# Patient Record
Sex: FEMALE | Race: WHITE | NOT HISPANIC OR LATINO | URBAN - METROPOLITAN AREA
[De-identification: names, ages, dates, MRNs, and addresses within clinical notes are randomized per-mention and may not be internally consistent; named-entity substitution may affect disease eponyms.]

---

## 2023-04-08 DIAGNOSIS — N63.25 BREAST LUMP ON LEFT SIDE AT 12 O'CLOCK POSITION: Primary | ICD-10-CM

## 2023-04-08 PROBLEM — Z80.3 FAMILY HISTORY OF BREAST CANCER: Status: ACTIVE | Noted: 2023-04-08

## 2023-04-08 PROBLEM — N60.12 FIBROCYSTIC DISEASE OF LEFT BREAST: Status: ACTIVE | Noted: 2023-04-08

## 2023-04-08 NOTE — PROGRESS NOTES
[facility]  Chu Walters MD, FACS  Osmar Morgan NP-C      Chief Complaint:   Joan Nava is a 57 y.o. female presenting today for her 6 month evaluation. She is due for her mammogram.  She reports no interval changes.     History of Present Illness:   Mrs. Elijah first presented on August 3, 2020 due to a left breast mass that increased in size and sonocore biopsy revealed Fibrocystic changes including stromal fibrosis, cyst formation and apocrine metaplasia. Negative for atypia and malignancy. She also presented with benign cystic changes that have remained stable. Her OSIRIS was calculated in 2021 and found to give her a 26.3 % Lifetime Risk of Breast Cancer. MD::: Edna Sullivan MD.    Past Medical History:   Diagnosis Date    Breast lump on left side at 12 o'clock position 4/8/2023    Dense breast tissue on mammogram 4/19/2023    Family history of breast cancer 4/8/2023    Fibrocystic disease of left breast 4/8/2023      History reviewed. No pertinent surgical history.     Current Outpatient Medications:     calcium 26-vit D3-magnesium 15 167 mg calcium- 1.67 mcg-83 mg Cap, Take by mouth once daily., Disp: , Rfl:     ergocalciferol, vitamin D2, 10 mcg (400 unit) Tab, Take 1,000 Int'l Units by mouth., Disp: , Rfl:    Review of patient's allergies indicates:   Allergen Reactions    Sulfa (sulfonamide antibiotics) Rash      Social History     Tobacco Use    Smoking status: Not on file    Smokeless tobacco: Not on file   Substance Use Topics    Alcohol use: Not on file      History reviewed. No pertinent family history.     Review of Systems   Integumentary:  Negative for color change, rash, mole/lesion, breast mass, breast discharge and breast tenderness.   Breast: Negative for mass and tenderness     Physical Exam   HENT:   Head: Normocephalic.   Pulmonary/Chest: Right breast exhibits no inverted nipple, no mass, no nipple discharge, no skin change and no tenderness.  Left breast exhibits no inverted nipple, no mass, no nipple discharge, no skin change and no tenderness. No breast swelling.   Genitourinary: No breast swelling.   Musculoskeletal: Lymphadenopathy:      Upper Body:      Right upper body: No supraclavicular or axillary adenopathy.      Left upper body: No supraclavicular or axillary adenopathy.     Neurological: She is alert.      MAMMOGRAM REPORT: she has bilateral nodules    ULTRASOUND REPORT: She has bilateral complicated cysts; NEM    NOTE:::We viewed her films together at today's visit.  We discussed the multiple views obtained and the important findings.  Even benign changes were mentioned and her questions were answered.  She knows that she may receive a formal letter or report from the Radiologist.  She is to contact us if she has questions.        ASSESSMENT and PLAN of CARE    1. Breast lump on left side at 12 o'clock position  Assessment & Plan:  She understands that her exams have remained stable and is comfortable being followed in a conservative fashion. She understands the importance of monthly self-breast examination and knows to report any and all changes as they occur.    She has new right breast nodularity that is consistent with FCCs at ultrasound - see below.        2. Family history of breast cancer  Assessment & Plan:  We discussed her family history and how it could impact her own future risks.  We discussed family vs. genetic history and the importance and implications of each.  Genetic Counseling/Testing was offered, and all questions answered to her satisfaction.  She knows that as additional family members are diagnosed - she will need to let us know as this may change follow up and imaging recommendations.        3. Fibrocystic disease of left breast  Assessment & Plan:  She has new findings of complicated cysts bilaterally.  These will be followed in the short term.    We discussed our fibrocystic mastopathy protocol in detail. She knows  that if she follows this protocol - that her symptoms should improve.  We discussed how breast pain is usually not associated with breast cancer, however, pain can be the presenting symptom with some cancers (but this could be coincidental). Still, if her pain does not improve in 8-12 weeks she should call us back for additional recommendations.        4. Dense breast tissue on mammogram  Assessment & Plan:  We discussed the amount of dense tissue that is seen on her mammogram.  There is data to suggest that dense breast tissue increases breast cancer risk - exactly how much is difficult to accurately calculate.  She realizes that this dense tissue can make it difficult or impossible to view all tissue in the breast to rule out all issues.  It can also make it more difficult to see cancerous tissue.  Due to this, she should be diligent in her monthly exam and report changes as they occur.  Supplemental imaging may also be recommended such as a screening bilateral ultrasound or MRI of the Breast.  She knows that our goal is to assist her in finding breast cancer at an earlier stage (should she develop one).  While nothing is guaranteed - complimenting her annual mammogram with supplemental imaging could help.        Medical Decision Making:  It is my impression that this patient suffers all conditions contained in this medical document.  Each of these conditions did affect our plan of care and my medical decision making today.  It is my opinion that the medical decision making concerning this patient was of minimal  difficulty based on the aforementioned conditions.  Any further recommendations will be communicated to the patient by me.  I have reviewed and verified her allergies, list of medications, medical and surgical histories, social history, and a pertinent review of symptoms.     Follow up:  6 months and prn    For:  US JANN PEDRAZA) yanely stanford

## 2023-04-19 ENCOUNTER — OFFICE VISIT (OUTPATIENT)
Dept: SURGERY | Facility: CLINIC | Age: 58
End: 2023-04-19
Payer: COMMERCIAL

## 2023-04-19 VITALS — BODY MASS INDEX: 27.49 KG/M2 | WEIGHT: 165 LBS | HEIGHT: 65 IN

## 2023-04-19 DIAGNOSIS — R92.30 DENSE BREAST TISSUE ON MAMMOGRAM: ICD-10-CM

## 2023-04-19 DIAGNOSIS — N63.25 BREAST LUMP ON LEFT SIDE AT 12 O'CLOCK POSITION: Primary | ICD-10-CM

## 2023-04-19 DIAGNOSIS — N60.12 FIBROCYSTIC DISEASE OF LEFT BREAST: ICD-10-CM

## 2023-04-19 DIAGNOSIS — Z80.3 FAMILY HISTORY OF BREAST CANCER: ICD-10-CM

## 2023-04-19 PROCEDURE — 99213 PR OFFICE/OUTPT VISIT, EST, LEVL III, 20-29 MIN: ICD-10-PCS | Mod: S$GLB,,, | Performed by: SPECIALIST

## 2023-04-19 PROCEDURE — 99213 OFFICE O/P EST LOW 20 MIN: CPT | Mod: S$GLB,,, | Performed by: SPECIALIST

## 2023-04-19 RX ORDER — CALCIUM 26/MAGNESIUM 15/ZINC 167MG-83MG
CAPSULE ORAL DAILY
COMMUNITY

## 2023-04-19 RX ORDER — ERGOCALCIFEROL (VITAMIN D2) 10 MCG
1000 TABLET ORAL
COMMUNITY

## 2023-10-02 DIAGNOSIS — N63.13 MASS OF LOWER OUTER QUADRANT OF RIGHT BREAST: Primary | ICD-10-CM

## 2023-10-02 DIAGNOSIS — N63.21 MASS OF UPPER OUTER QUADRANT OF LEFT BREAST: ICD-10-CM

## 2023-10-02 PROBLEM — N60.12 DIFFUSE CYSTIC MASTOPATHY OF BOTH BREASTS: Status: ACTIVE | Noted: 2023-04-08

## 2023-10-02 PROBLEM — N60.11 DIFFUSE CYSTIC MASTOPATHY OF BOTH BREASTS: Status: ACTIVE | Noted: 2023-04-08

## 2023-10-03 NOTE — PROGRESS NOTES
[facility]  MD Osmar Horne, NP-C    Date of Service: 10/10/2023      Chief Complaint:   Joan Nava is a 58 y.o. female presenting today for  6 month follow up. She is due for Ultrasound  She reports no interval changes on her self-breast examination.     History of Present Illness:   Mrs. Elijah first presented on August 3, 2020 due to a left breast mass that increased in size and sonocore biopsy revealed Fibrocystic changes including stromal fibrosis, cyst formation and apocrine metaplasia. Negative for atypia and malignancy. She also presented with benign cystic changes that have remained stable. Her OSIRIS was calculated in 2021 and found to give her a 26.3 % Lifetime Risk of Breast Cancer. MD::: Edna Sullivan MD.    Past Medical History:   Diagnosis Date    Breast lump on left side at 12 o'clock position 4/8/2023    Dense breast tissue on mammogram 4/19/2023    Family history of breast cancer 4/8/2023    Fibrocystic disease of left breast 4/8/2023    Mass of lower outer quadrant of right breast 10/2/2023    Mass of upper outer quadrant of left breast 10/2/2023      Past Surgical History:   Procedure Laterality Date    Breast Sonocore Biopsy Left 08/03/2021    c section  1995        Current Outpatient Medications:     calcium 26-vit D3-magnesium 15 167 mg calcium- 1.67 mcg-83 mg Cap, Take by mouth once daily., Disp: , Rfl:     ergocalciferol, vitamin D2, 10 mcg (400 unit) Tab, Take 1,000 Int'l Units by mouth., Disp: , Rfl:    Review of patient's allergies indicates:   Allergen Reactions    Sulfa (sulfonamide antibiotics) Rash      Social History     Tobacco Use    Smoking status: Never    Smokeless tobacco: Never   Substance Use Topics    Alcohol use: Not on file      Family History   Problem Relation Age of Onset    Breast cancer Mother 71    Breast cancer Sister 43        Gene negative    Ovarian cancer Neg Hx         Review of Systems    Integumentary:  Negative for color change, rash, mole/lesion, breast mass, breast discharge and breast tenderness.   Breast: Negative for mass and tenderness       Physical Exam   HENT:   Head: Normocephalic.   Pulmonary/Chest: Right breast exhibits no inverted nipple, no mass, no nipple discharge, no skin change and no tenderness. Left breast exhibits no inverted nipple, no mass, no nipple discharge, no skin change and no tenderness. No breast swelling.   Genitourinary: No breast swelling.   Musculoskeletal: Lymphadenopathy:      Upper Body:      Right upper body: No supraclavicular or axillary adenopathy.      Left upper body: No supraclavicular or axillary adenopathy.     Neurological: She is alert.      Ultrasound: Complicated cysts of the right and left breast, unchanged from comparison studies and are probably benign. No new sonographic abnormalities identified. Recommend follow up with annual mammogram in April 2024.      Assessment/Plan  1. Mass of lower outer quadrant of right breast  Assessment & Plan:  We reviewed our findings today and her questions were answered.  She understands that her imaging and exams have remained stable (and show nothing concerning).  She is comfortable being followed in a conservative fashion.      She understands the importance of monthly self-breast examination and knows to report any and all changes as they occur.        2. Mass of upper outer quadrant of left breast  Assessment & Plan:  Same as above      3. Diffuse cystic mastopathy of both breasts  Assessment & Plan:  We discussed our Fibrocystic Mastopathy Protocol in detail. She should take Vitamin E 800 IU everyday x 3 months or until non-tender then can stop Vitamin E vs. continue daily at 400 IU.  The use of ice packs or warms soaks to tender area of the breast may also be of some benefit.  If warm soaks help her tenderness - She can use Aspercreme (unless allergic to Aspirin) on the affected area.  Ibuprofen (if no  contraindications) at 800 mg three times per day for 5 days can also relieve many symptoms associated with swollen or inflamed tissue.  She can repeat Ibuprofen for 5 days, but then should be off for 5 days as it may cause gastric upset.  It is a good idea to wear a tight bra during the day and night to minimize movement of the tender area (Sports Bras work well).  Evening Primrose Oil can be bought over the counter and used at a dose of 3000 mg per day to help with any breast pain/tenderness not improved by implementing the above measures.               Medical Decision Making:  It is my impression that this patient suffers all conditions contained in this medical document.  Each of these conditions did affect our plan of care and my medical decision making today.  It is my opinion that the medical decision making concerning this patient was of moderate difficulty based on the aforementioned conditions.  Any further recommendations will be communicated to the patient by me.  I have reviewed and verified her allergies, list of medications, medical and surgical histories, social history, and a pertinent review of symptoms.      Follow up:  6 months and prn    For:  DASIA FORTE) at

## 2023-10-10 ENCOUNTER — OFFICE VISIT (OUTPATIENT)
Dept: SURGERY | Facility: CLINIC | Age: 58
End: 2023-10-10
Payer: COMMERCIAL

## 2023-10-10 DIAGNOSIS — N63.21 MASS OF UPPER OUTER QUADRANT OF LEFT BREAST: ICD-10-CM

## 2023-10-10 DIAGNOSIS — N60.12 DIFFUSE CYSTIC MASTOPATHY OF BOTH BREASTS: ICD-10-CM

## 2023-10-10 DIAGNOSIS — N60.11 DIFFUSE CYSTIC MASTOPATHY OF BOTH BREASTS: ICD-10-CM

## 2023-10-10 DIAGNOSIS — N63.13 MASS OF LOWER OUTER QUADRANT OF RIGHT BREAST: Primary | ICD-10-CM

## 2023-10-10 PROCEDURE — 99213 OFFICE O/P EST LOW 20 MIN: CPT | Mod: S$GLB,,, | Performed by: NURSE PRACTITIONER

## 2023-10-10 PROCEDURE — 99999 PR PBB SHADOW E&M-EST. PATIENT-LVL III: CPT | Mod: PBBFAC,,, | Performed by: NURSE PRACTITIONER

## 2023-10-10 PROCEDURE — 99999 PR PBB SHADOW E&M-EST. PATIENT-LVL III: ICD-10-PCS | Mod: PBBFAC,,, | Performed by: NURSE PRACTITIONER

## 2023-10-10 PROCEDURE — 99213 PR OFFICE/OUTPT VISIT, EST, LEVL III, 20-29 MIN: ICD-10-PCS | Mod: S$GLB,,, | Performed by: NURSE PRACTITIONER

## 2024-04-05 DIAGNOSIS — N63.13 MASS OF LOWER OUTER QUADRANT OF RIGHT BREAST: Primary | ICD-10-CM

## 2024-04-05 DIAGNOSIS — N63.21 MASS OF UPPER OUTER QUADRANT OF LEFT BREAST: ICD-10-CM

## 2024-04-10 ENCOUNTER — TELEPHONE (OUTPATIENT)
Dept: SURGERY | Facility: CLINIC | Age: 59
End: 2024-04-10
Payer: COMMERCIAL

## 2024-04-10 NOTE — TELEPHONE ENCOUNTER
Called pt to reschedule appt for 4/18 at 315    ----- Message from Mary Cueva sent at 4/10/2024 11:58 AM CDT -----  Type:  Needs Medical Advice    Who Called: Joan Nava  Symptoms (please be specific): -   How long has patient had these symptoms:  -  Pharmacy name and phone #:  -  Would the patient rather a call back or a response via [facility]?   Best Call Back Number: [phone]    Additional Information: pt needs appt RS ( she can't make it , she has to work (I tried to rs appt, but nothing populated in Epic )

## 2024-04-17 NOTE — PROGRESS NOTES
[facility]  MD Osmar Horne, NP-C    Date of Service: 4/18/2024      Chief Complaint:   Joan Nava is a 58 y.o. female presenting today for  6 month follow up. She is due for Mammogram  She reports no interval changes on her self-breast examination.     History of Present Illness:   Mrs. Elijah first presented on August 3, 2020 due to a left breast mass that increased in size and sonocore biopsy revealed Fibrocystic changes including stromal fibrosis, cyst formation and apocrine metaplasia. Negative for atypia and malignancy. She also presented with benign cystic changes that have remained stable. Her OSIRIS was calculated in 2021 and found to give her a 26.3 % Lifetime Risk of Breast Cancer. MD::: Edna Sullivan MD.     Past Medical History:   Diagnosis Date    Breast lump on left side at 12 o'clock position 4/8/2023    Dense breast tissue on mammogram 4/19/2023    Family history of breast cancer 4/8/2023    Fibrocystic disease of left breast 4/8/2023    Mass of lower outer quadrant of right breast 10/2/2023    Mass of upper outer quadrant of left breast 10/2/2023      Past Surgical History:   Procedure Laterality Date    Breast Sonocore Biopsy Left 08/03/2021    c section  1995        Current Outpatient Medications:     calcium 26-vit D3-magnesium 15 167 mg calcium- 1.67 mcg-83 mg Cap, Take by mouth once daily., Disp: , Rfl:     ergocalciferol, vitamin D2, 10 mcg (400 unit) Tab, Take 1,000 Int'l Units by mouth., Disp: , Rfl:    Review of patient's allergies indicates:   Allergen Reactions    Sulfa (sulfonamide antibiotics) Rash      Social History     Tobacco Use    Smoking status: Never    Smokeless tobacco: Never   Substance Use Topics    Alcohol use: Not on file      Family History   Problem Relation Name Age of Onset    Breast cancer Mother  71    Breast cancer Sister  43        Gene negative    Ovarian cancer Neg Hx          Review of Systems    Integumentary:  Negative for color change, rash, mole/lesion, breast mass, breast discharge and breast tenderness.   Breast: Negative for mass and tenderness       Physical Exam   HENT:   Head: Normocephalic.   Pulmonary/Chest: Right breast exhibits no inverted nipple, no mass, no nipple discharge, no skin change and no tenderness. Left breast exhibits no inverted nipple, no mass, no nipple discharge, no skin change and no tenderness. No breast swelling.   Genitourinary: No breast swelling.   Musculoskeletal: Lymphadenopathy:      Upper Body:      Right upper body: No supraclavicular or axillary adenopathy.      Left upper body: No supraclavicular or axillary adenopathy.     Neurological: She is alert.        MAMMOGRAM REPORT:FINDINGS: The patient has had left breast core biopsy. The breast tissue is heterogeneously dense, which lowers the sensitivity of mammography. There are no suspicious masses or suspicious calcifications seen to suggest malignancy. Scattered, circumscribed, benign-appearing masses are demonstrated. IMPRESSION: No evidence of malignancy. No significant change when compared to previous exam. Follow-up mammography is recommended in one year.    NOTE:::We viewed her films together at today's visit.  We discussed the multiple views obtained and the important findings.  Even benign changes were mentioned and her questions were answered.  She knows that she may receive a formal letter or report from the Radiologist.  She is to contact us if she has questions.      Assessment/Plan  1. Mass of lower outer quadrant of right breast  Assessment & Plan:  We reviewed our findings today and her questions were answered.  She understands that her imaging and exams have remained stable (and show nothing concerning).  She is comfortable being followed in a conservative fashion.      She understands the importance of monthly self-breast examination and knows to report any and all changes as they  occur.    NOTE:::We viewed her films together at today's visit.  We discussed the multiple views obtained and the important findings.  Even benign changes were mentioned and her questions were answered.  She knows that she may receive a formal letter or report from the Radiologist.  She is to contact us if she has questions.           2. Mass of upper outer quadrant of left breast  Assessment & Plan:  Same as above      3. Diffuse cystic mastopathy of both breasts  Assessment & Plan:  We discussed our Fibrocystic Mastopathy Protocol in detail. She should take Vitamin E 800 IU everyday x 3 months or until non-tender then can stop Vitamin E vs. continue daily at 400 IU.  The use of ice packs or warms soaks to tender area of the breast may also be of some benefit.  If warm soaks help her tenderness - She can use Aspercreme (unless allergic to Aspirin) on the affected area.  Ibuprofen (if no contraindications) at 800 mg three times per day for 5 days can also relieve many symptoms associated with swollen or inflamed tissue.  She can repeat Ibuprofen for 5 days, but then should be off for 5 days as it may cause gastric upset.  It is a good idea to wear a tight bra during the day and night to minimize movement of the tender area (Sports Bras work well).  Evening Primrose Oil can be bought over the counter and used at a dose of 3000 mg per day to help with any breast pain/tenderness not improved by implementing the above measures.        4. Family history of breast cancer  Assessment & Plan:  We discussed her family history and how it could impact her own future risks.  We discussed family vs. genetic history and the importance and implications of each. All questions answered to her satisfaction.  She knows that as additional family members are diagnosed - she will need to let us know as this may change follow up and imaging recommendations.               Medical Decision Making:  It is my impression that this patient  suffers all conditions contained in this medical document.  Each of these conditions did affect our plan of care and my medical decision making today.  It is my opinion that the medical decision making concerning this patient was of moderate difficulty based on the aforementioned conditions.  Any further recommendations will be communicated to the patient by me.  I have reviewed and verified her allergies, list of medications, medical and surgical histories, social history, and a pertinent review of symptoms.      Follow up:  6 months and prn    For:  MRI vs Ultrasound; pt wants MRI

## 2024-04-17 NOTE — ASSESSMENT & PLAN NOTE
We reviewed our findings today and her questions were answered.  She understands that her imaging and exams have remained stable (and show nothing concerning).  She is comfortable being followed in a conservative fashion.      She understands the importance of monthly self-breast examination and knows to report any and all changes as they occur.    NOTE:::We viewed her films together at today's visit.  We discussed the multiple views obtained and the important findings.  Even benign changes were mentioned and her questions were answered.  She knows that she may receive a formal letter or report from the Radiologist.  She is to contact us if she has questions.

## 2024-04-18 ENCOUNTER — OFFICE VISIT (OUTPATIENT)
Dept: SURGERY | Facility: CLINIC | Age: 59
End: 2024-04-18
Payer: COMMERCIAL

## 2024-04-18 DIAGNOSIS — N63.21 MASS OF UPPER OUTER QUADRANT OF LEFT BREAST: ICD-10-CM

## 2024-04-18 DIAGNOSIS — N60.11 DIFFUSE CYSTIC MASTOPATHY OF BOTH BREASTS: ICD-10-CM

## 2024-04-18 DIAGNOSIS — Z80.3 FAMILY HISTORY OF BREAST CANCER: ICD-10-CM

## 2024-04-18 DIAGNOSIS — N63.13 MASS OF LOWER OUTER QUADRANT OF RIGHT BREAST: Primary | ICD-10-CM

## 2024-04-18 DIAGNOSIS — N60.12 DIFFUSE CYSTIC MASTOPATHY OF BOTH BREASTS: ICD-10-CM

## 2024-04-18 PROCEDURE — 99999 PR PBB SHADOW E&M-EST. PATIENT-LVL II: CPT | Mod: PBBFAC,,, | Performed by: NURSE PRACTITIONER

## 2024-04-18 PROCEDURE — 99213 OFFICE O/P EST LOW 20 MIN: CPT | Mod: S$GLB,,, | Performed by: NURSE PRACTITIONER

## 2024-10-17 NOTE — ASSESSMENT & PLAN NOTE
We discussed the amount of dense tissue that is seen on her mammogram.  There is data to suggest that dense breast tissue increases breast cancer risk - exactly how much is difficult to accurately calculate.  She realizes that this dense tissue can make it difficult or impossible to view all tissue in the breast to rule out all issues.  It can also make it more difficult to see cancerous tissue.  Due to this, she should be diligent in her monthly exam and report changes as they occur.  Supplemental imaging may also be recommended such as a screening bilateral ultrasound or MRI of the Breast.  She knows that our goal is to assist her in finding breast cancer at an earlier stage (should she develop one).  While nothing is guaranteed - complimenting her annual mammogram with supplemental imaging could help.

## 2024-10-17 NOTE — PROGRESS NOTES
[facility]  Chu Walters MD, FACS  Osmar Morgan, NP-C      Date of Service: 10/18/2024    Chief Complaint:   Joan Nava is a 59 y.o. female presenting today for her 6 month evaluation. She is due for her annual Breast MRI.  She reports no interval changes.     History of Present Illness:   Mrs. Elijah first presented on August 3, 2020 due to a left breast mass that increased in size and sonocore biopsy revealed Fibrocystic changes including stromal fibrosis, cyst formation and apocrine metaplasia. Negative for atypia and malignancy. She also presented with benign cystic changes that have remained stable. Her OSIRIS was calculated in 2021 and found to give her a 26.3 % Lifetime Risk of Breast Cancer. MD::: Edna Sullivan MD.     Past Medical History:   Diagnosis Date    Breast lump on left side at 12 o'clock position 4/8/2023    Dense breast tissue on mammogram 4/19/2023    Family history of breast cancer 4/8/2023    Fibrocystic disease of left breast 4/8/2023    Mass of lower outer quadrant of right breast 10/2/2023    Mass of upper outer quadrant of left breast 10/2/2023      Past Surgical History:   Procedure Laterality Date    Breast Sonocore Biopsy Left 08/03/2021    c section  1995        Current Outpatient Medications:     calcium 26-vit D3-magnesium 15 167 mg calcium- 1.67 mcg-83 mg Cap, Take by mouth once daily., Disp: , Rfl:     ergocalciferol, vitamin D2, 10 mcg (400 unit) Tab, Take 1,000 Int'l Units by mouth., Disp: , Rfl:    Review of patient's allergies indicates:   Allergen Reactions    Sulfa (sulfonamide antibiotics) Rash      Social History     Tobacco Use    Smoking status: Never    Smokeless tobacco: Never   Substance Use Topics    Alcohol use: Not on file      Family History   Problem Relation Name Age of Onset    Breast cancer Mother  71    Breast cancer Sister  43        Gene negative    Ovarian cancer Neg Hx          Review of Systems    Integumentary:  Negative for color change, rash, mole/lesion, breast mass, breast discharge and breast tenderness.   Breast: Negative for mass and tenderness       Physical Exam   HENT:   Head: Normocephalic.   Pulmonary/Chest: Right breast exhibits no inverted nipple, no mass, no nipple discharge, no skin change and no tenderness. Left breast exhibits no inverted nipple, no mass, no nipple discharge, no skin change and no tenderness. No breast swelling.   Genitourinary: No breast swelling.   Musculoskeletal: Lymphadenopathy:      Upper Body:      Right upper body: No supraclavicular or axillary adenopathy.      Left upper body: No supraclavicular or axillary adenopathy.     Neurological: She is alert.        MRI BREAST REPORT: pending.  I will phone her with the report and make recommendations at that time.    ASSESSMENT and PLAN OF CARE     1. Family history of breast cancer  Assessment & Plan:  We discussed her family history and how it could impact her own future risks.  We discussed family vs. genetic history and the importance and implications of each. All questions answered to her satisfaction.  She knows that as additional family members are diagnosed - she will need to let us know as this may change follow up and imaging recommendations.        2. Mass of lower outer quadrant of right breast  Assessment & Plan:  We reviewed our findings today and her questions were answered.  She understands that her imaging and exams have remained stable (and show nothing concerning).  She is comfortable being followed in a conservative fashion.      She understands the importance of monthly self-breast examination and knows to report any and all changes as they occur.    NOTE:::We viewed her films together at today's visit.  We discussed the multiple views obtained and the important findings.  Even benign changes were mentioned and her questions were answered.  She knows that she may receive a formal letter or report  from the Radiologist.  She is to contact us if she has questions.           3. Mass of upper outer quadrant of left breast  Assessment & Plan:  Same as above      4. Diffuse cystic mastopathy of both breasts  Assessment & Plan:  We discussed our Fibrocystic Mastopathy Protocol in detail. She should take Vitamin E 800 IU everyday x 3 months or until non-tender then can stop Vitamin E vs. continue daily at 400 IU.  The use of ice packs or warms soaks to tender area of the breast may also be of some benefit.  If warm soaks help her tenderness - She can use Aspercreme (unless allergic to Aspirin) on the affected area.  Ibuprofen (if no contraindications) at 800 mg three times per day for 5 days can also relieve many symptoms associated with swollen or inflamed tissue.  She can repeat Ibuprofen for 5 days, but then should be off for 5 days as it may cause gastric upset.  It is a good idea to wear a tight bra during the day and night to minimize movement of the tender area (Sports Bras work well).  Evening Primrose Oil can be bought over the counter and used at a dose of 3000 mg per day to help with any breast pain/tenderness not improved by implementing the above measures.        5. Heterogeneously dense tissue of both breasts on mammography  Assessment & Plan:  We discussed the amount of dense tissue that is seen on her mammogram.  There is data to suggest that dense breast tissue increases breast cancer risk - exactly how much is difficult to accurately calculate.  She realizes that this dense tissue can make it difficult or impossible to view all tissue in the breast to rule out all issues.  It can also make it more difficult to see cancerous tissue.  Due to this, she should be diligent in her monthly exam and report changes as they occur.  Supplemental imaging may also be recommended such as a screening bilateral ultrasound or MRI of the Breast.  She knows that our goal is to assist her in finding breast cancer at an  earlier stage (should she develop one).  While nothing is guaranteed - complimenting her annual mammogram with supplemental imaging could help.        Medical Decision Making: It is my impression that this patient suffers all conditions contained in this medical document.  Each of these conditions did affect our plan of care and my medical decision making today.  It is my opinion that the medical decision making concerning this patient was of minimal  difficulty based on the aforementioned conditions.  Any further recommendations will be communicated to the patient by me.  I have reviewed and verified her allergies, list of medications, medical and surgical histories, social history, and a pertinent review of symptoms.     Follow up:  6 months and prn    For:  Physical Examination and MGM (D) at

## 2024-10-18 ENCOUNTER — OFFICE VISIT (OUTPATIENT)
Dept: SURGERY | Facility: CLINIC | Age: 59
End: 2024-10-18
Payer: COMMERCIAL

## 2024-10-18 DIAGNOSIS — N60.12 DIFFUSE CYSTIC MASTOPATHY OF BOTH BREASTS: ICD-10-CM

## 2024-10-18 DIAGNOSIS — N63.21 MASS OF UPPER OUTER QUADRANT OF LEFT BREAST: ICD-10-CM

## 2024-10-18 DIAGNOSIS — Z80.3 FAMILY HISTORY OF BREAST CANCER: Primary | ICD-10-CM

## 2024-10-18 DIAGNOSIS — N63.13 MASS OF LOWER OUTER QUADRANT OF RIGHT BREAST: ICD-10-CM

## 2024-10-18 DIAGNOSIS — N60.11 DIFFUSE CYSTIC MASTOPATHY OF BOTH BREASTS: ICD-10-CM

## 2024-10-18 DIAGNOSIS — R92.333 HETEROGENEOUSLY DENSE TISSUE OF BOTH BREASTS ON MAMMOGRAPHY: ICD-10-CM

## 2024-10-18 PROCEDURE — 99999 PR PBB SHADOW E&M-EST. PATIENT-LVL II: CPT | Mod: PBBFAC,,, | Performed by: NURSE PRACTITIONER

## 2024-10-21 ENCOUNTER — TELEPHONE (OUTPATIENT)
Dept: SURGERY | Facility: CLINIC | Age: 59
End: 2024-10-21
Payer: COMMERCIAL

## 2024-10-21 NOTE — TELEPHONE ENCOUNTER
Pt. Updated on her MRI results. She will RTC in 6 months for her MGM. She will check her schedule and call back to schedule.